# Patient Record
Sex: FEMALE | URBAN - METROPOLITAN AREA
[De-identification: names, ages, dates, MRNs, and addresses within clinical notes are randomized per-mention and may not be internally consistent; named-entity substitution may affect disease eponyms.]

---

## 2019-09-10 ENCOUNTER — APPOINTMENT (RX ONLY)
Dept: URBAN - METROPOLITAN AREA CLINIC 106 | Facility: CLINIC | Age: 24
Setting detail: DERMATOLOGY
End: 2019-09-10

## 2019-09-10 DIAGNOSIS — L70.0 ACNE VULGARIS: ICD-10-CM

## 2019-09-10 PROCEDURE — 99202 OFFICE O/P NEW SF 15 MIN: CPT

## 2019-09-10 PROCEDURE — ? ADDITIONAL NOTES

## 2019-09-10 PROCEDURE — ? TREATMENT REGIMEN

## 2019-09-10 PROCEDURE — ? PRESCRIPTION

## 2019-09-10 PROCEDURE — ? COUNSELING

## 2019-09-10 RX ORDER — TRETINOIN 0.5 MG/G
CREAM TOPICAL
Qty: 1 | Refills: 5 | Status: ERX

## 2019-09-10 RX ORDER — TRETINOIN 0.5 MG/G
CREAM TOPICAL
Qty: 1 | Refills: 5 | Status: ERX | COMMUNITY
Start: 2019-09-10

## 2019-09-10 RX ADMIN — TRETINOIN 1: 0.5 CREAM TOPICAL at 00:00

## 2019-09-10 ASSESSMENT — LOCATION ZONE DERM: LOCATION ZONE: FACE

## 2019-09-10 ASSESSMENT — LOCATION SIMPLE DESCRIPTION DERM
LOCATION SIMPLE: LEFT CHEEK
LOCATION SIMPLE: RIGHT CHEEK

## 2019-09-10 ASSESSMENT — LOCATION DETAILED DESCRIPTION DERM
LOCATION DETAILED: RIGHT INFERIOR MEDIAL MALAR CHEEK
LOCATION DETAILED: LEFT CENTRAL MALAR CHEEK

## 2019-09-10 NOTE — PROCEDURE: MIPS QUALITY
Quality 131: Pain Assessment And Follow-Up: Pain assessment NOT documented as being performed, documentation the patient is not eligible for a pain assessment using a standardized tool
Quality 130: Documentation Of Current Medications In The Medical Record: Current Medications Documented
Quality 110: Preventive Care And Screening: Influenza Immunization: Influenza Immunization previously received during influenza season
Detail Level: Detailed
Quality 226: Preventive Care And Screening: Tobacco Use: Screening And Cessation Intervention: Patient screened for tobacco use and is an ex/non-smoker

## 2019-09-10 NOTE — PROCEDURE: TREATMENT REGIMEN
Hide Differin Products: No
Action 3: Continue
Detail Level: Zone
Start Regimen: tretinoin 0.05 % topical cream \\nDays Supply: 30\\nSig: Apply to face every other night to every night
Samples Given: CeraVe foaming facial cleanser, pt will wash face two times a day

## 2019-10-22 ENCOUNTER — APPOINTMENT (RX ONLY)
Dept: URBAN - METROPOLITAN AREA CLINIC 106 | Facility: CLINIC | Age: 24
Setting detail: DERMATOLOGY
End: 2019-10-22

## 2019-10-22 DIAGNOSIS — L70.0 ACNE VULGARIS: ICD-10-CM

## 2019-10-22 PROCEDURE — ? COUNSELING

## 2019-10-22 PROCEDURE — ? TREATMENT REGIMEN

## 2019-10-22 PROCEDURE — 99213 OFFICE O/P EST LOW 20 MIN: CPT

## 2019-10-22 PROCEDURE — ? PRESCRIPTION

## 2019-10-22 PROCEDURE — ? ADDITIONAL NOTES

## 2019-10-22 RX ORDER — SPIRONOLACTONE 25 MG/1
TABLET, FILM COATED ORAL
Qty: 60 | Refills: 3 | Status: ERX | COMMUNITY
Start: 2019-10-22

## 2019-10-22 RX ADMIN — SPIRONOLACTONE 1: 25 TABLET, FILM COATED ORAL at 00:00

## 2019-10-22 ASSESSMENT — LOCATION DETAILED DESCRIPTION DERM
LOCATION DETAILED: RIGHT INFERIOR MEDIAL MALAR CHEEK
LOCATION DETAILED: LEFT CENTRAL MALAR CHEEK

## 2019-10-22 ASSESSMENT — LOCATION ZONE DERM: LOCATION ZONE: FACE

## 2019-10-22 ASSESSMENT — LOCATION SIMPLE DESCRIPTION DERM
LOCATION SIMPLE: LEFT CHEEK
LOCATION SIMPLE: RIGHT CHEEK

## 2019-10-22 NOTE — PROCEDURE: MIPS QUALITY
Quality 226: Preventive Care And Screening: Tobacco Use: Screening And Cessation Intervention: Patient screened for tobacco use and is an ex/non-smoker
Detail Level: Detailed
Quality 130: Documentation Of Current Medications In The Medical Record: Current Medications Documented
Quality 110: Preventive Care And Screening: Influenza Immunization: Influenza Immunization previously received during influenza season
Quality 131: Pain Assessment And Follow-Up: Pain assessment NOT documented as being performed, documentation the patient is not eligible for a pain assessment using a standardized tool

## 2019-10-22 NOTE — PROCEDURE: TREATMENT REGIMEN
Continue Regimen: tretinoin 0.05 % topical cream \\nAptly to face every other night to every night
Samples Given: CeraVe hydrating facial cleanser/ Neutrogena gentle cleanser- wash face bid
Action 1: Continue
Hide Panoxyl Products: No
Discontinue Regimen: CeraVe foaming facial cleanser, pt will wash face two times a day
Detail Level: Zone
Start Regimen: Spironolactone 25 mg bid

## 2020-01-09 ENCOUNTER — APPOINTMENT (RX ONLY)
Dept: URBAN - METROPOLITAN AREA CLINIC 106 | Facility: CLINIC | Age: 25
Setting detail: DERMATOLOGY
End: 2020-01-09

## 2020-01-09 DIAGNOSIS — L70.0 ACNE VULGARIS: ICD-10-CM

## 2020-01-09 PROCEDURE — ? ADDITIONAL NOTES

## 2020-01-09 PROCEDURE — ? PRESCRIPTION

## 2020-01-09 PROCEDURE — 99212 OFFICE O/P EST SF 10 MIN: CPT

## 2020-01-09 PROCEDURE — ? TREATMENT REGIMEN

## 2020-01-09 PROCEDURE — ? COUNSELING

## 2020-01-09 ASSESSMENT — LOCATION ZONE DERM: LOCATION ZONE: FACE

## 2020-01-09 ASSESSMENT — LOCATION DETAILED DESCRIPTION DERM
LOCATION DETAILED: RIGHT INFERIOR MEDIAL MALAR CHEEK
LOCATION DETAILED: LEFT CENTRAL MALAR CHEEK

## 2020-01-09 ASSESSMENT — LOCATION SIMPLE DESCRIPTION DERM
LOCATION SIMPLE: RIGHT CHEEK
LOCATION SIMPLE: LEFT CHEEK

## 2020-01-09 NOTE — PROCEDURE: TREATMENT REGIMEN
Hide Skinmedica Products: No
Detail Level: Zone
Discontinue Regimen: CeraVe foaming facial cleanser, pt will wash face two times a day
Action 3: Continue
Continue Regimen: tretinoin 0.05 % topical cream \\nAptly to face every other night to every night
Samples Given: CeraVe hydrating facial cleanser/ Neutrogena gentle cleanser- wash face bid
Increase Regimen: Spironolactone 25 mg bid, 2 tabs po qam, 1 tab po qpm

## 2020-01-09 NOTE — PROCEDURE: ADDITIONAL NOTES
Detail Level: Simple
Additional Notes: Largest comedones expressed with CTAs and comedone extractor about 20+
Additional Notes: Hydro facial + extraction with Octavia/Aleksandra -  info given

## 2020-01-09 NOTE — PROCEDURE: MIPS QUALITY
Quality 226: Preventive Care And Screening: Tobacco Use: Screening And Cessation Intervention: Patient screened for tobacco use and is an ex/non-smoker
Quality 110: Preventive Care And Screening: Influenza Immunization: Influenza Immunization previously received during influenza season
Quality 131: Pain Assessment And Follow-Up: Pain assessment NOT documented as being performed, documentation the patient is not eligible for a pain assessment using a standardized tool
Detail Level: Detailed
Quality 130: Documentation Of Current Medications In The Medical Record: Current Medications Documented

## 2020-02-07 ENCOUNTER — APPOINTMENT (RX ONLY)
Dept: URBAN - METROPOLITAN AREA CLINIC 115 | Facility: CLINIC | Age: 25
Setting detail: DERMATOLOGY
End: 2020-02-07

## 2020-02-07 DIAGNOSIS — Z41.9 ENCOUNTER FOR PROCEDURE FOR PURPOSES OTHER THAN REMEDYING HEALTH STATE, UNSPECIFIED: ICD-10-CM

## 2020-02-07 PROCEDURE — ? SILKPEEL DERMALINFUSION

## 2020-02-07 ASSESSMENT — LOCATION SIMPLE DESCRIPTION DERM
LOCATION SIMPLE: LEFT FOREHEAD
LOCATION SIMPLE: RIGHT EAR

## 2020-02-07 ASSESSMENT — LOCATION ZONE DERM
LOCATION ZONE: EAR
LOCATION ZONE: FACE

## 2020-02-07 ASSESSMENT — LOCATION DETAILED DESCRIPTION DERM
LOCATION DETAILED: RIGHT SUPERIOR CRUS OF ANTIHELIX
LOCATION DETAILED: LEFT INFERIOR MEDIAL FOREHEAD

## 2020-02-07 NOTE — PROCEDURE: SILKPEEL DERMALINFUSION
Detail Level: Zone
Vacuum Pressure In Inches: 3
Endpoint: mild erythema
Comments (Will Appear Before The Postcare): Added steam and extractions. Recommend another treatment in 2-3 weeks for extractions then a series of skinpen for scarring
Facial Treatment Head Used?: Facial: Medium-Fine (100 grit) 6 mm
Solution Used: Pore Clarifying Solution
Treatment Number (Optional): 1
Post-Care Instructions: I reviewed with the patient in detail post-care instructions. Patient should stay away from the sun and wear sun protection until treated areas are fully healed.
Intelliflow Setting: 100%
Body Treatment Head Used?: Not Used
Consent: Written consent obtained, risks reviewed including but not limited to crusting, scabbing, blistering, scarring, darker or lighter pigmentary change, bruising, and/or incomplete response.
Number Of Passes: 2

## 2020-02-28 ENCOUNTER — APPOINTMENT (RX ONLY)
Dept: URBAN - METROPOLITAN AREA CLINIC 115 | Facility: CLINIC | Age: 25
Setting detail: DERMATOLOGY
End: 2020-02-28

## 2020-02-28 DIAGNOSIS — Z41.9 ENCOUNTER FOR PROCEDURE FOR PURPOSES OTHER THAN REMEDYING HEALTH STATE, UNSPECIFIED: ICD-10-CM

## 2020-02-28 PROCEDURE — ? SILKPEEL DERMALINFUSION

## 2020-02-28 ASSESSMENT — LOCATION DETAILED DESCRIPTION DERM: LOCATION DETAILED: LEFT MEDIAL FOREHEAD

## 2020-02-28 ASSESSMENT — LOCATION ZONE DERM: LOCATION ZONE: FACE

## 2020-02-28 ASSESSMENT — LOCATION SIMPLE DESCRIPTION DERM: LOCATION SIMPLE: LEFT FOREHEAD

## 2020-02-28 NOTE — PROCEDURE: SILKPEEL DERMALINFUSION
Post-Care Instructions: I reviewed with the patient in detail post-care instructions. Patient should stay away from the sun and wear sun protection until treated areas are fully healed.
Facial Treatment Head Used?: Facial: Medium-Fine (100 grit) 6 mm
Solution Used: Pore Clarifying Solution
Detail Level: Zone
Intelliflow Setting: 100%
Consent: Written consent obtained, risks reviewed including but not limited to crusting, scabbing, blistering, scarring, darker or lighter pigmentary change, bruising, and/or incomplete response.
Comments (Will Appear Before The Postcare): Added steam and extractions
Number Of Passes: 3
Body Treatment Head Used?: Not Used

## 2020-03-05 ENCOUNTER — RX ONLY (OUTPATIENT)
Age: 25
Setting detail: RX ONLY
End: 2020-03-05

## 2020-03-05 ENCOUNTER — APPOINTMENT (RX ONLY)
Dept: URBAN - METROPOLITAN AREA CLINIC 106 | Facility: CLINIC | Age: 25
Setting detail: DERMATOLOGY
End: 2020-03-05

## 2020-03-05 DIAGNOSIS — Z41.9 ENCOUNTER FOR PROCEDURE FOR PURPOSES OTHER THAN REMEDYING HEALTH STATE, UNSPECIFIED: ICD-10-CM

## 2020-03-05 DIAGNOSIS — L70.0 ACNE VULGARIS: ICD-10-CM

## 2020-03-05 PROCEDURE — ? ADDITIONAL NOTES

## 2020-03-05 PROCEDURE — ? COUNSELING

## 2020-03-05 PROCEDURE — 99212 OFFICE O/P EST SF 10 MIN: CPT

## 2020-03-05 PROCEDURE — ? TREATMENT REGIMEN

## 2020-03-05 ASSESSMENT — LOCATION ZONE DERM: LOCATION ZONE: FACE

## 2020-03-05 ASSESSMENT — LOCATION DETAILED DESCRIPTION DERM
LOCATION DETAILED: RIGHT INFERIOR MEDIAL MALAR CHEEK
LOCATION DETAILED: LEFT CENTRAL MALAR CHEEK

## 2020-03-05 ASSESSMENT — LOCATION SIMPLE DESCRIPTION DERM
LOCATION SIMPLE: LEFT CHEEK
LOCATION SIMPLE: RIGHT CHEEK

## 2020-03-05 NOTE — PROCEDURE: ADDITIONAL NOTES
Detail Level: Simple
Additional Notes: Goal: if acne remains controlled, may decrease Spironalactone to 25mg bid...if with continued control, may d/c and continue wash/Tretinoin.\\n\\nRecommend SkinPen treatment for acne scarring

## 2020-03-05 NOTE — PROCEDURE: TREATMENT REGIMEN
Action 2: Continue
Continue Regimen: tretinoin 0.05 % topical cream \\nAptly to face every other night to every night\\n\\n\\nSpironolactone 25 mg bid, 2 tabs po qam, 1 tab po qpm
Hide Vanicream Products: No
Detail Level: Zone
Samples Given: CeraVe hydrating facial cleanser/ Neutrogena gentle cleanser- wash face bid
Discontinue Regimen: CeraVe foaming facial cleanser, pt will wash face two times a day

## 2020-03-05 NOTE — PROCEDURE: ADDITIONAL NOTES
Additional Notes: Steam with bridget mask followed by exfoliating scrub, steam towel and manual extractions. Patient has deep comedones and some scarring. At this point I’m recommending skin pen 3 sessions to help with deeper comedones and scars. Reevaluate after 3 and plan for monthly facial treatments.
Detail Level: Simple

## 2020-03-11 ENCOUNTER — APPOINTMENT (RX ONLY)
Dept: URBAN - METROPOLITAN AREA CLINIC 115 | Facility: CLINIC | Age: 25
Setting detail: DERMATOLOGY
End: 2020-03-11

## 2020-03-11 DIAGNOSIS — Z41.9 ENCOUNTER FOR PROCEDURE FOR PURPOSES OTHER THAN REMEDYING HEALTH STATE, UNSPECIFIED: ICD-10-CM

## 2020-03-11 PROCEDURE — ? MICRONEEDLING

## 2020-03-11 NOTE — PROCEDURE: MICRONEEDLING
Post-Care Instructions: After the procedure, take precautions agains sun exposure. Do not apply sunscreen for 12 hours after the procedure. Do not apply make-up for 12 hours after the procedure. Avoid alcohol based toners for 10-14 days. After 2-3 days patients can return to their regular skin regimen.
Location #3: nose
Detail Level: Zone
Depth In Mm: 1.5
Location #5: scars
Location #2: cheeks
Depth In Mm: 0.5
Consent: Written consent obtained, risks reviewed including but not limited to pain, scarring, infection and incomplete improvement.  Patient understands the procedure is cosmetic in nature and will require out of pocket payment.
Location #4: bonnie oral
Depth In Mm: 1
Topical Anesthesia?: 20% benzocaine, 8% lidocaine, 4% tetracaine
Location #1: forehead and bonnie orbital
Depth In Mm: 1.75
Treatment Number (Optional): 0
Depth In Mm: 2.5

## 2020-05-20 ENCOUNTER — APPOINTMENT (RX ONLY)
Dept: URBAN - METROPOLITAN AREA CLINIC 115 | Facility: CLINIC | Age: 25
Setting detail: DERMATOLOGY
End: 2020-05-20

## 2020-05-20 DIAGNOSIS — Z41.9 ENCOUNTER FOR PROCEDURE FOR PURPOSES OTHER THAN REMEDYING HEALTH STATE, UNSPECIFIED: ICD-10-CM

## 2020-05-20 PROCEDURE — ? MICRONEEDLING

## 2020-05-20 NOTE — PROCEDURE: MICRONEEDLING
Post-Care Instructions: After the procedure, take precautions agains sun exposure. Do not apply sunscreen for 12 hours after the procedure. Do not apply make-up for 12 hours after the procedure. Avoid alcohol based toners for 10-14 days. After 2-3 days patients can return to their regular skin regimen.
Depth In Mm: 0.5
Treatment Number (Optional): 2
Depth In Mm: 1
Location #4: bonnie oral
Depth In Mm: 2.5
Location #2: cheeks
Location #1: forehead and bonnie orbital
Topical Anesthesia?: 20% benzocaine, 8% lidocaine, 4% tetracaine
Consent: Written consent obtained, risks reviewed including but not limited to pain, scarring, infection and incomplete improvement.  Patient understands the procedure is cosmetic in nature and will require out of pocket payment.
Location #5: nose
Detail Level: Zone
Depth In Mm: 1.5
Location #3: scars
Depth In Mm: 1.75

## 2020-06-17 ENCOUNTER — APPOINTMENT (RX ONLY)
Dept: URBAN - METROPOLITAN AREA CLINIC 115 | Facility: CLINIC | Age: 25
Setting detail: DERMATOLOGY
End: 2020-06-17

## 2020-06-17 DIAGNOSIS — Z41.9 ENCOUNTER FOR PROCEDURE FOR PURPOSES OTHER THAN REMEDYING HEALTH STATE, UNSPECIFIED: ICD-10-CM

## 2020-06-17 PROCEDURE — ? MICRONEEDLING

## 2020-06-17 NOTE — PROCEDURE: MICRONEEDLING
Depth In Mm: 0.1
Treatment Number (Optional): 3
Location #4: scars
Consent: Written consent obtained, risks reviewed including but not limited to pain, scarring, infection and incomplete improvement.  Patient understands the procedure is cosmetic in nature and will require out of pocket payment.
Location #1: forehead, nose and bonnie orbital
Depth In Mm: 1.5
Depth In Mm: 2.5
Location #2: cheeks
Depth In Mm: 0.75
Post-Care Instructions: After the procedure, take precautions agains sun exposure. Do not apply sunscreen for 12 hours after the procedure. Do not apply make-up for 12 hours after the procedure. Avoid alcohol based toners for 10-14 days. After 2-3 days patients can return to their regular skin regimen.
Detail Level: Zone
Depth In Mm: 1.75
Location #3: bonnie oral